# Patient Record
Sex: MALE | Race: WHITE
[De-identification: names, ages, dates, MRNs, and addresses within clinical notes are randomized per-mention and may not be internally consistent; named-entity substitution may affect disease eponyms.]

---

## 2019-06-11 ENCOUNTER — HOSPITAL ENCOUNTER (OUTPATIENT)
Dept: HOSPITAL 95 - ORSCSDS | Age: 74
Discharge: HOME | End: 2019-06-11
Attending: INTERNAL MEDICINE
Payer: MEDICARE

## 2019-06-11 VITALS — WEIGHT: 209.44 LBS | BODY MASS INDEX: 31.74 KG/M2 | HEIGHT: 67.99 IN

## 2019-06-11 DIAGNOSIS — E66.9: ICD-10-CM

## 2019-06-11 DIAGNOSIS — K29.70: ICD-10-CM

## 2019-06-11 DIAGNOSIS — K21.9: ICD-10-CM

## 2019-06-11 DIAGNOSIS — K31.7: ICD-10-CM

## 2019-06-11 DIAGNOSIS — K20.8: ICD-10-CM

## 2019-06-11 DIAGNOSIS — E78.5: ICD-10-CM

## 2019-06-11 DIAGNOSIS — K22.70: Primary | ICD-10-CM

## 2019-06-11 DIAGNOSIS — Z79.899: ICD-10-CM

## 2019-06-11 DIAGNOSIS — J44.9: ICD-10-CM

## 2019-06-11 PROCEDURE — 0DB58ZX EXCISION OF ESOPHAGUS, VIA NATURAL OR ARTIFICIAL OPENING ENDOSCOPIC, DIAGNOSTIC: ICD-10-PCS | Performed by: INTERNAL MEDICINE

## 2019-06-11 PROCEDURE — 0DB68ZX EXCISION OF STOMACH, VIA NATURAL OR ARTIFICIAL OPENING ENDOSCOPIC, DIAGNOSTIC: ICD-10-PCS | Performed by: INTERNAL MEDICINE

## 2019-06-11 NOTE — NUR
06/11/19 0751 Addie Erazo
1 MISSED IV DALY VALVE IN AND
1 MISSED IV BY MA VEIN BLEW RFA
1 GOOD IV BY MA IN AND PT TOW